# Patient Record
Sex: MALE | Race: WHITE
[De-identification: names, ages, dates, MRNs, and addresses within clinical notes are randomized per-mention and may not be internally consistent; named-entity substitution may affect disease eponyms.]

---

## 2018-10-12 ENCOUNTER — HOSPITAL ENCOUNTER (EMERGENCY)
Dept: HOSPITAL 92 - ERS | Age: 28
Discharge: HOME | End: 2018-10-12
Payer: SELF-PAY

## 2018-10-12 DIAGNOSIS — F98.8: ICD-10-CM

## 2018-10-12 DIAGNOSIS — Z79.899: ICD-10-CM
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
## 2018-10-12 DIAGNOSIS — S09.90XA: Primary | ICD-10-CM

## 2018-10-12 DIAGNOSIS — R68.84: ICD-10-CM
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
## 2018-10-12 DIAGNOSIS — F41.9: ICD-10-CM

## 2018-10-12 PROCEDURE — 70486 CT MAXILLOFACIAL W/O DYE: CPT

## 2018-10-12 PROCEDURE — 70450 CT HEAD/BRAIN W/O DYE: CPT

## 2018-10-12 NOTE — CT
CT OF FACIAL BONES PERFORMED WITHOUT CONTRAST ENHANCEMENT:

10/12/18

 

HISTORY: 

Facial injury, jaw pain. 

 

The zygomatic arches and nasal bone are intact. There is minimal mucosal change in the right maxillar
y sinus. There are no air fluid levels. There is no signs of any orbital or maxillary fractures. 

 

The mandible appears intact. Condyles are in normal position. 

 

IMPRESSION:  

No CT evidence of fracture of the facial bones. 

 

POS: [facility]

## 2018-10-12 NOTE — CT
CT OF BRAIN PERFORMED WITHOUT CONTRAST ENHANCEMENT:

10/12/18

 

HISTORY: 

Loss of consciousness. 

 

COMPARISON:  

6/11/13 study.

 

Ventricular and cisternal system shows no focal findings. No signs of intracerebral hemorrhage or ext
ra-axial fluid collections. Mastoid air cells are clear. There is some minimal mucosal change in the 
right maxillary sinus. 

 

IMPRESSION:  

No acute intracranial abnormalities.

 

POS: [facility]